# Patient Record
Sex: MALE | Race: BLACK OR AFRICAN AMERICAN | URBAN - METROPOLITAN AREA
[De-identification: names, ages, dates, MRNs, and addresses within clinical notes are randomized per-mention and may not be internally consistent; named-entity substitution may affect disease eponyms.]

---

## 2017-01-31 ENCOUNTER — OFFICE VISIT (OUTPATIENT)
Dept: INTERNAL MEDICINE | Age: 57
End: 2017-01-31

## 2017-01-31 VITALS
OXYGEN SATURATION: 98 % | HEART RATE: 67 BPM | HEIGHT: 69 IN | DIASTOLIC BLOOD PRESSURE: 74 MMHG | BODY MASS INDEX: 28.29 KG/M2 | SYSTOLIC BLOOD PRESSURE: 122 MMHG | WEIGHT: 191 LBS

## 2017-01-31 DIAGNOSIS — Z00.00 ROUTINE GENERAL MEDICAL EXAMINATION AT A HEALTH CARE FACILITY: Primary | ICD-10-CM

## 2017-01-31 DIAGNOSIS — J30.2 SEASONAL ALLERGIC RHINITIS, UNSPECIFIED ALLERGIC RHINITIS TRIGGER: Chronic | ICD-10-CM

## 2017-01-31 DIAGNOSIS — E78.5 HYPERLIPIDEMIA, UNSPECIFIED HYPERLIPIDEMIA TYPE: Chronic | ICD-10-CM

## 2017-01-31 DIAGNOSIS — Z12.11 SPECIAL SCREENING FOR MALIGNANT NEOPLASMS, COLON: ICD-10-CM

## 2017-01-31 DIAGNOSIS — Z12.5 SPECIAL SCREENING FOR MALIGNANT NEOPLASM OF PROSTATE: ICD-10-CM

## 2017-01-31 LAB
ALBUMIN SERPL-MCNC: 4.4 G/DL (ref 3.4–5)
ALP BLD-CCNC: 59 U/L (ref 40–129)
ALT SERPL-CCNC: 26 U/L (ref 10–40)
ANION GAP SERPL CALCULATED.3IONS-SCNC: 15 MMOL/L (ref 3–16)
AST SERPL-CCNC: 22 U/L (ref 15–37)
BASOPHILS ABSOLUTE: 0 K/UL (ref 0–0.2)
BASOPHILS RELATIVE PERCENT: 0.7 %
BILIRUB SERPL-MCNC: 0.7 MG/DL (ref 0–1)
BILIRUBIN DIRECT: <0.2 MG/DL (ref 0–0.3)
BILIRUBIN, INDIRECT: NORMAL MG/DL (ref 0–1)
BILIRUBIN, POC: NORMAL
BLOOD URINE, POC: NORMAL
BUN BLDV-MCNC: 14 MG/DL (ref 7–20)
CALCIUM SERPL-MCNC: 9.5 MG/DL (ref 8.3–10.6)
CHLORIDE BLD-SCNC: 104 MMOL/L (ref 99–110)
CHOLESTEROL, TOTAL: 170 MG/DL (ref 0–199)
CLARITY, POC: CLEAR
CO2: 26 MMOL/L (ref 21–32)
COLOR, POC: YELLOW
CREAT SERPL-MCNC: 0.8 MG/DL (ref 0.9–1.3)
EOSINOPHILS ABSOLUTE: 0.3 K/UL (ref 0–0.6)
EOSINOPHILS RELATIVE PERCENT: 6.9 %
GFR AFRICAN AMERICAN: >60
GFR NON-AFRICAN AMERICAN: >60
GLUCOSE BLD-MCNC: 80 MG/DL (ref 70–99)
GLUCOSE URINE, POC: NORMAL
HCT VFR BLD CALC: 44.6 % (ref 40.5–52.5)
HDLC SERPL-MCNC: 39 MG/DL (ref 40–60)
HEMOGLOBIN: 15 G/DL (ref 13.5–17.5)
KETONES, POC: NORMAL
LDL CHOLESTEROL CALCULATED: 121 MG/DL
LEUKOCYTE EST, POC: NORMAL
LYMPHOCYTES ABSOLUTE: 2.1 K/UL (ref 1–5.1)
LYMPHOCYTES RELATIVE PERCENT: 42.1 %
MCH RBC QN AUTO: 31.3 PG (ref 26–34)
MCHC RBC AUTO-ENTMCNC: 33.7 G/DL (ref 31–36)
MCV RBC AUTO: 92.9 FL (ref 80–100)
MONOCYTES ABSOLUTE: 0.6 K/UL (ref 0–1.3)
MONOCYTES RELATIVE PERCENT: 11.4 %
NEUTROPHILS ABSOLUTE: 2 K/UL (ref 1.7–7.7)
NEUTROPHILS RELATIVE PERCENT: 38.9 %
NITRITE, POC: NORMAL
PDW BLD-RTO: 13.7 % (ref 12.4–15.4)
PH, POC: 6
PHOSPHORUS: 3.2 MG/DL (ref 2.5–4.9)
PLATELET # BLD: 203 K/UL (ref 135–450)
PMV BLD AUTO: 9.1 FL (ref 5–10.5)
POTASSIUM SERPL-SCNC: 4.7 MMOL/L (ref 3.5–5.1)
PROSTATE SPECIFIC ANTIGEN: 0.55 NG/ML (ref 0–4)
PROTEIN, POC: NORMAL
RBC # BLD: 4.81 M/UL (ref 4.2–5.9)
SODIUM BLD-SCNC: 145 MMOL/L (ref 136–145)
SPECIFIC GRAVITY, POC: 1.01
TOTAL PROTEIN: 7.5 G/DL (ref 6.4–8.2)
TRIGL SERPL-MCNC: 49 MG/DL (ref 0–150)
TSH SERPL DL<=0.05 MIU/L-ACNC: 1.47 UIU/ML (ref 0.27–4.2)
UROBILINOGEN, POC: 0.2
VLDLC SERPL CALC-MCNC: 10 MG/DL
WBC # BLD: 5 K/UL (ref 4–11)

## 2017-01-31 PROCEDURE — 82270 OCCULT BLOOD FECES: CPT | Performed by: INTERNAL MEDICINE

## 2017-01-31 PROCEDURE — 81002 URINALYSIS NONAUTO W/O SCOPE: CPT | Performed by: INTERNAL MEDICINE

## 2017-01-31 PROCEDURE — 99396 PREV VISIT EST AGE 40-64: CPT | Performed by: INTERNAL MEDICINE

## 2017-01-31 RX ORDER — ROSUVASTATIN CALCIUM 5 MG/1
5 TABLET, COATED ORAL DAILY
Qty: 90 TABLET | Refills: 3 | Status: SHIPPED | OUTPATIENT
Start: 2017-01-31

## 2017-01-31 ASSESSMENT — ENCOUNTER SYMPTOMS
RESPIRATORY NEGATIVE: 1
EYES NEGATIVE: 1
GASTROINTESTINAL NEGATIVE: 1

## 2017-02-15 ENCOUNTER — TELEPHONE (OUTPATIENT)
Dept: INTERNAL MEDICINE | Age: 57
End: 2017-02-15

## 2024-09-02 ENCOUNTER — APPOINTMENT (OUTPATIENT)
Dept: GENERAL RADIOLOGY | Age: 64
End: 2024-09-02
Payer: COMMERCIAL

## 2024-09-02 ENCOUNTER — HOSPITAL ENCOUNTER (EMERGENCY)
Age: 64
Discharge: HOME | End: 2024-09-02
Attending: EMERGENCY MEDICINE
Payer: COMMERCIAL

## 2024-09-02 VITALS
HEART RATE: 82 BPM | SYSTOLIC BLOOD PRESSURE: 144 MMHG | TEMPERATURE: 98.3 F | HEIGHT: 69 IN | WEIGHT: 189.6 LBS | DIASTOLIC BLOOD PRESSURE: 86 MMHG | RESPIRATION RATE: 12 BRPM | BODY MASS INDEX: 28.08 KG/M2 | OXYGEN SATURATION: 96 %

## 2024-09-02 DIAGNOSIS — M25.461 KNEE EFFUSION, RIGHT: Primary | ICD-10-CM

## 2024-09-02 PROCEDURE — 73562 X-RAY EXAM OF KNEE 3: CPT

## 2024-09-02 PROCEDURE — 99283 EMERGENCY DEPT VISIT LOW MDM: CPT

## 2024-09-02 NOTE — ED PROVIDER NOTES
Effusion present. No swelling, bony tenderness or crepitus. Normal range of motion. No tenderness. No LCL laxity, MCL laxity or PCL laxity. Abnormal meniscus. Normal alignment. Normal pulse.      Left knee: Normal.   Neurological:      Mental Status: He is alert.             DIAGNOSTIC RESULTS   LABS:   Labs Reviewed - No data to display   When ordered only abnormal lab results are displayed. All other labs were within normal range or not returned as of this dictation.     EKG:      RADIOLOGY:    Non-plain film images such as CT, Ultrasound and MRI are read by the radiologist. Plain radiographic images are visualized and preliminarily interpreted by the ED Provider with the below findings:      Interpretation per the Radiologist below, if available at the time of this note:    XR KNEE RIGHT (3 VIEWS)   Final Result   Nonspecific small to moderate size knee joint effusion.  No radiographic   evidence of acute fracture or dislocation seen.      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                  EMERGENCY DEPARTMENT COURSE and DIFFERENTIAL DIAGNOSIS/MDM:     Vitals:    Vitals:    09/02/24 1628   BP: (!) 144/86   Pulse: 82   Resp: 12   Temp: 98.3 °F (36.8 °C)   TempSrc: Oral   SpO2: 96%   Weight: 86 kg (189 lb 9.5 oz)   Height: 1.753 m (5' 9\")        Patient was given the following medications:   Medications - No data to display          CC/HPI Summary, DDx, ED Course, and Reassessment: Above history and physical exam performed.  I reviewed his past medical past surgical/family history.  Review system was performed and is negative as otherwise noted.  I considered the diagnosis of septic arthritis versus bursitis versus knee fracture versus arthritic effusion.  He has a benign exam other than the small to moderate knee effusion.  He does have some clicking and popping with Radha's test laterally.  Overall I think he likely has a meniscal injury he also has some degenerative changes on

## 2024-09-02 NOTE — DISCHARGE INSTRUCTIONS
1.  Rest ice and elevation to the affected knee for the next few days.  2.  Tylenol or ibuprofen over-the-counter for swelling and Ace wrap as needed.  3.  Follow-up with your primary care physician as planned.  4.  Return emergency department for severe worsening pain or signs of infection

## 2024-09-02 NOTE — ED NOTES
Patient DCed from ED at this time. Discussed AVS, follow up, and scripts. They verbalized understanding. Reinforced that should symptoms persist or worsen to return to the ED. They verbalized understanding. Patient ambulated out of ED. RN thanked patient for choosing [facility].

## 2025-04-30 ENCOUNTER — HOSPITAL ENCOUNTER (EMERGENCY)
Age: 65
Discharge: HOME | End: 2025-04-30
Attending: EMERGENCY MEDICINE

## 2025-04-30 ENCOUNTER — APPOINTMENT (OUTPATIENT)
Dept: GENERAL RADIOLOGY | Age: 65
End: 2025-04-30

## 2025-04-30 VITALS
WEIGHT: 188.05 LBS | TEMPERATURE: 98.1 F | RESPIRATION RATE: 16 BRPM | SYSTOLIC BLOOD PRESSURE: 141 MMHG | OXYGEN SATURATION: 98 % | HEART RATE: 86 BPM | BODY MASS INDEX: 27.85 KG/M2 | HEIGHT: 69 IN | DIASTOLIC BLOOD PRESSURE: 80 MMHG

## 2025-04-30 DIAGNOSIS — S39.012A STRAIN OF LUMBAR REGION, INITIAL ENCOUNTER: Primary | ICD-10-CM

## 2025-04-30 DIAGNOSIS — M62.838 SPASM OF MUSCLE: ICD-10-CM

## 2025-04-30 PROCEDURE — 72100 X-RAY EXAM L-S SPINE 2/3 VWS: CPT

## 2025-04-30 PROCEDURE — 6370000000 HC RX 637 (ALT 250 FOR IP): Performed by: EMERGENCY MEDICINE

## 2025-04-30 PROCEDURE — 99283 EMERGENCY DEPT VISIT LOW MDM: CPT

## 2025-04-30 RX ORDER — LIDOCAINE 50 MG/G
1 PATCH TOPICAL DAILY
Qty: 30 PATCH | Refills: 0 | Status: SHIPPED | OUTPATIENT
Start: 2025-04-30

## 2025-04-30 RX ORDER — DIAZEPAM 5 MG/1
5 TABLET ORAL EVERY 8 HOURS PRN
Qty: 15 TABLET | Refills: 0 | Status: SHIPPED | OUTPATIENT
Start: 2025-04-30 | End: 2025-05-05

## 2025-04-30 RX ORDER — NAPROXEN 250 MG/1
500 TABLET ORAL ONCE
Status: COMPLETED | OUTPATIENT
Start: 2025-04-30 | End: 2025-04-30

## 2025-04-30 RX ORDER — NAPROXEN 500 MG/1
500 TABLET ORAL 2 TIMES DAILY WITH MEALS
Qty: 60 TABLET | Refills: 0 | Status: SHIPPED | OUTPATIENT
Start: 2025-04-30

## 2025-04-30 RX ORDER — DIAZEPAM 5 MG/1
5 TABLET ORAL ONCE
Status: COMPLETED | OUTPATIENT
Start: 2025-04-30 | End: 2025-04-30

## 2025-04-30 RX ADMIN — DIAZEPAM 5 MG: 5 TABLET ORAL at 21:07

## 2025-04-30 RX ADMIN — NAPROXEN SODIUM 500 MG: 250 TABLET ORAL at 21:07

## 2025-04-30 ASSESSMENT — PAIN SCALES - GENERAL
PAINLEVEL_OUTOF10: 3
PAINLEVEL_OUTOF10: 10

## 2025-04-30 ASSESSMENT — PAIN DESCRIPTION - LOCATION
LOCATION: BACK
LOCATION: BACK

## 2025-04-30 ASSESSMENT — PAIN DESCRIPTION - DESCRIPTORS
DESCRIPTORS: ACHING;OTHER (COMMENT)
DESCRIPTORS: ACHING

## 2025-04-30 ASSESSMENT — PAIN DESCRIPTION - ORIENTATION
ORIENTATION: LOWER
ORIENTATION: LOWER

## 2025-04-30 ASSESSMENT — PAIN - FUNCTIONAL ASSESSMENT: PAIN_FUNCTIONAL_ASSESSMENT: 0-10

## 2025-04-30 ASSESSMENT — LIFESTYLE VARIABLES
HOW OFTEN DO YOU HAVE A DRINK CONTAINING ALCOHOL: NEVER
HOW MANY STANDARD DRINKS CONTAINING ALCOHOL DO YOU HAVE ON A TYPICAL DAY: PATIENT DOES NOT DRINK

## 2025-05-01 NOTE — ED TRIAGE NOTES
Pt states that he has back pain that flares up every year or two. Pt states that he does a lot of pushing and pulling at his job. Pt states that he was reaching for something out of his car and that is when the pain stated  on Monday. Pt denies any straining, chest pain, SOB. Pt is A&Ox4, UAL, VSS on RA.  Family at bedside.

## 2025-05-01 NOTE — ED PROVIDER NOTES
[facility] EMERGENCY DEPARTMENT     EMERGENCY DEPARTMENT ENCOUNTER            Pt Name: Live Meadows   MRN: [id number]   Birthdate 1960   Date of evaluation: 4/30/2025   Provider: Mary Johnson MD   PCP: Charbel Saucedo MD   Note Started: 9:02 PM EDT 4/30/25          CHIEF COMPLAINT     Chief Complaint   Patient presents with    Back Pain     Pt states that he has back pain that flares up every year or two. Pt states that he does a lot of pushing and pulling at his job. Pt states that he was reaching for something out of his car and that is when the pain stated  on Monday. Pt denies any straining, chest pain, SOB. Pt is A&Ox4, UAL, VSS on RA.               HISTORY OF PRESENT ILLNESS:   History from : Patient   Limitations to history : None     Live Meadows is a 64 y.o. male who presents with mid to low back pain which has caused him to now have such severe muscle spasm that he is holding his spine in a corrected position.  Patient occasionally has low back pain reports he has been doing a lot of pushing and pulling at work.  Patient denies any radiation to his legs or weakness in his legs or difficulty walking.  Patient denies any recent falls.  Denies any urinary or fecal incontinence.  Denies urinary retention.  Denies fevers.  Denies any history of IVDA    Nursing Notes were all reviewed and agreed with, or any disagreements were addressed in the HPI.     REVIEW OF SYSTEMS :    Positives and Pertinent negatives as per HPI.      MEDICAL HISTORY   has a past medical history of Allergic rhinitis, Bell's palsy, Eczema (4/27/2011), Hyperlipidemia, and Seasonal allergic rhinitis (1/31/2017).    History reviewed. No pertinent surgical history.   CURRENTMEDICATIONS       Discharge Medication List as of 4/30/2025 10:05 PM        CONTINUE these medications which have NOT CHANGED    Details   rosuvastatin (CRESTOR) 5 MG tablet Take 1 tablet by mouth daily, Disp-90 tablet, R-3Print            SCREENINGS

## 2025-05-01 NOTE — DISCHARGE INSTRUCTIONS
Take diazepam every 8 hours as needed for muscular spasm and pain.  Take naproxen twice daily for inflammation.  Apply ice and heat packs as needed for comfort.  Apply Lidoderm patches every 12 hours as directed.  Follow-up closely with your regular doctor for referral to physical therapy or further workup as needed

## 2025-05-01 NOTE — ED NOTES
D/C: Order noted for d/c. Pt confirmed d/c paperwork has correct name. Discharge and education instructions reviewed with patient. Teach-back successful.  Pt verbalized understanding and denied questions at this time. No acute distress noted. Patient instructed to follow-up as noted - return to emergency department if symptoms worsen. Patient verbalized understanding. Discharged per EDMD with discharge instructions. Pt discharged to private vehicle. Patient stable upon departure. Thanked patient for [facility] for care. Provider aware of patient pain at time of discharge.